# Patient Record
Sex: FEMALE | Race: WHITE
[De-identification: names, ages, dates, MRNs, and addresses within clinical notes are randomized per-mention and may not be internally consistent; named-entity substitution may affect disease eponyms.]

---

## 2020-09-21 LAB
ADD MANUAL MICROSCOPIC: YES
ALBUMIN SERPL-MCNC: 4.4 G/DL (ref 3.5–5)
ALP SERPL-CCNC: 87 U/L (ref 38–126)
ANION GAP SERPL CALC-SCNC: 7 MMOL/L (ref 5–19)
APPEARANCE UR: (no result)
APTT PPP: YELLOW S
AST SERPL-CCNC: 24 U/L (ref 14–36)
BACTERIA #/AREA URNS HPF: (no result) /HPF
BILIRUB DIRECT SERPL-MCNC: 0.2 MG/DL (ref 0–0.4)
BILIRUB SERPL-MCNC: 0.4 MG/DL (ref 0.2–1.3)
BILIRUB UR QL STRIP: NEGATIVE
BUN SERPL-MCNC: 20 MG/DL (ref 7–20)
CALCIUM: 9.3 MG/DL (ref 8.4–10.2)
CHLORIDE SERPL-SCNC: 108 MMOL/L (ref 98–107)
CO2 SERPL-SCNC: 27 MMOL/L (ref 22–30)
ERYTHROCYTE [DISTWIDTH] IN BLOOD BY AUTOMATED COUNT: 13.2 % (ref 11.5–14)
GLUCOSE SERPL-MCNC: 99 MG/DL (ref 75–110)
GLUCOSE UR STRIP-MCNC: NEGATIVE MG/DL
HCT VFR BLD CALC: 42 % (ref 36–47)
HGB BLD-MCNC: 14.3 G/DL (ref 12–15.5)
KETONES UR STRIP-MCNC: NEGATIVE MG/DL
MCH RBC QN AUTO: 30.8 PG (ref 27–33.4)
MCHC RBC AUTO-ENTMCNC: 34 G/DL (ref 32–36)
MCV RBC AUTO: 91 FL (ref 80–97)
NITRITE UR QL STRIP: NEGATIVE
PH UR STRIP: 6 [PH] (ref 5–9)
PLATELET # BLD: 245 10^3/UL (ref 150–450)
POTASSIUM SERPL-SCNC: 4.7 MMOL/L (ref 3.6–5)
PROT SERPL-MCNC: 7 G/DL (ref 6.3–8.2)
PROT UR STRIP-MCNC: NEGATIVE MG/DL
RBC # BLD AUTO: 4.64 10^6/UL (ref 3.72–5.28)
SP GR UR STRIP: 1.02
UROBILINOGEN UR-MCNC: NEGATIVE MG/DL (ref ?–2)
WBC # BLD AUTO: 7.4 10^3/UL (ref 4–10.5)
WBC #/AREA URNS HPF: (no result) /HPF

## 2020-09-21 NOTE — RADIOLOGY REPORT (SQ)
EXAM DESCRIPTION:  CHEST PA/LATERAL



IMAGES COMPLETED DATE/TIME:  9/21/2020 12:38 pm



REASON FOR STUDY:  PRE-OP



COMPARISON:  1/9/2017



EXAM PARAMETERS:  NUMBER OF VIEWS: two views

TECHNIQUE: Digital Frontal and Lateral radiographic views of the chest acquired.

RADIATION DOSE: NA

LIMITATIONS: none



FINDINGS:  LUNGS AND PLEURA: No opacities, masses or pneumothorax. No pleural effusion.

MEDIASTINUM AND HILAR STRUCTURES: No masses or contour abnormalities.

HEART AND VASCULAR STRUCTURES: Heart normal size.  No evidence for failure.

BONES: No acute findings.

HARDWARE: None in the chest.

OTHER: No other significant finding.



IMPRESSION:  NO SIGNIFICANT RADIOGRAPHIC FINDING IN THE CHEST.



TECHNICAL DOCUMENTATION:  JOB ID:  [id number]

 2011 [employer]- All Rights Reserved



Reading location - IP/workstation name: [email or web address]

## 2020-09-24 ENCOUNTER — HOSPITAL ENCOUNTER (OUTPATIENT)
Dept: HOSPITAL 62 - OROUT | Age: 50
LOS: 1 days | Discharge: HOME | End: 2020-09-25
Attending: OBSTETRICS & GYNECOLOGY
Payer: COMMERCIAL

## 2020-09-24 DIAGNOSIS — Z91.040: ICD-10-CM

## 2020-09-24 DIAGNOSIS — N94.6: Primary | ICD-10-CM

## 2020-09-24 DIAGNOSIS — Z79.899: ICD-10-CM

## 2020-09-24 DIAGNOSIS — N87.0: ICD-10-CM

## 2020-09-24 DIAGNOSIS — E79.0: ICD-10-CM

## 2020-09-24 DIAGNOSIS — Z03.818: ICD-10-CM

## 2020-09-24 DIAGNOSIS — J45.909: ICD-10-CM

## 2020-09-24 DIAGNOSIS — N83.8: ICD-10-CM

## 2020-09-24 DIAGNOSIS — N93.9: ICD-10-CM

## 2020-09-24 DIAGNOSIS — Z87.891: ICD-10-CM

## 2020-09-24 DIAGNOSIS — D25.1: ICD-10-CM

## 2020-09-24 DIAGNOSIS — I10: ICD-10-CM

## 2020-09-24 PROCEDURE — 86850 RBC ANTIBODY SCREEN: CPT

## 2020-09-24 PROCEDURE — C1758 CATHETER, URETERAL: HCPCS

## 2020-09-24 PROCEDURE — A4649 SURGICAL SUPPLIES: HCPCS

## 2020-09-24 PROCEDURE — 80053 COMPREHEN METABOLIC PANEL: CPT

## 2020-09-24 PROCEDURE — 71046 X-RAY EXAM CHEST 2 VIEWS: CPT

## 2020-09-24 PROCEDURE — 87635 SARS-COV-2 COVID-19 AMP PRB: CPT

## 2020-09-24 PROCEDURE — 86901 BLOOD TYPING SEROLOGIC RH(D): CPT

## 2020-09-24 PROCEDURE — C9803 HOPD COVID-19 SPEC COLLECT: HCPCS

## 2020-09-24 PROCEDURE — 88307 TISSUE EXAM BY PATHOLOGIST: CPT

## 2020-09-24 PROCEDURE — 36415 COLL VENOUS BLD VENIPUNCTURE: CPT

## 2020-09-24 PROCEDURE — 81001 URINALYSIS AUTO W/SCOPE: CPT

## 2020-09-24 PROCEDURE — 93010 ELECTROCARDIOGRAM REPORT: CPT

## 2020-09-24 PROCEDURE — 58571 TLH W/T/O 250 G OR LESS: CPT

## 2020-09-24 PROCEDURE — 94799 UNLISTED PULMONARY SVC/PX: CPT

## 2020-09-24 PROCEDURE — 93005 ELECTROCARDIOGRAM TRACING: CPT

## 2020-09-24 PROCEDURE — 86900 BLOOD TYPING SEROLOGIC ABO: CPT

## 2020-09-24 PROCEDURE — 85027 COMPLETE CBC AUTOMATED: CPT

## 2020-09-24 PROCEDURE — 81025 URINE PREGNANCY TEST: CPT

## 2020-09-24 RX ADMIN — ATROPINE SULFATE ONE MG: 0.1 INJECTION, SOLUTION INTRAVENOUS at 10:45

## 2020-09-24 RX ADMIN — ATROPINE SULFATE ONE MG: 0.1 INJECTION, SOLUTION INTRAVENOUS at 10:40

## 2020-09-24 RX ADMIN — OXYCODONE AND ACETAMINOPHEN PRN TAB: 5; 325 TABLET ORAL at 22:29

## 2020-09-24 RX ADMIN — OXYCODONE AND ACETAMINOPHEN PRN TAB: 5; 325 TABLET ORAL at 14:40

## 2020-09-24 RX ADMIN — DOCUSATE SODIUM SCH: 100 CAPSULE, LIQUID FILLED ORAL at 12:14

## 2020-09-24 RX ADMIN — DOCUSATE SODIUM SCH MG: 100 CAPSULE, LIQUID FILLED ORAL at 17:09

## 2020-09-24 RX ADMIN — KETOROLAC TROMETHAMINE SCH MG: 30 INJECTION, SOLUTION INTRAMUSCULAR at 13:36

## 2020-09-24 RX ADMIN — ATROPINE SULFATE ONE MG: 0.1 INJECTION, SOLUTION INTRAVENOUS at 10:35

## 2020-09-24 RX ADMIN — KETOROLAC TROMETHAMINE SCH MG: 30 INJECTION, SOLUTION INTRAMUSCULAR at 21:16

## 2020-09-24 NOTE — OPERATIVE REPORT
Operative Report


DATE OF SURGERY: 09/24/20


PREOPERATIVE DIAGNOSIS: Abnormal uterine bleeding, pelvic pain,


POSTOPERATIVE DIAGNOSIS: Same


OPERATION: Biotic assisted total laparoscopic hysterectomy with bilateral 

salpingectomy and lysis of adhesions


SURGEON: JACK AMARAL


1ST ASSISTANT: MIAN GRAHAM


ANESTHESIA: GA


TISSUE REMOVED OR ALTERED: uterus Cervix bilateral fallopian tubes


COMPLICATIONS: 





None


ESTIMATED BLOOD LOSS: 150 cc


INTRAOPERATIVE FINDINGS: Uterus approximately 10 weeks size, adhesions of the 

descending bowel to the left fallopian tube at the epiploica, normal ovaries, 

fallopian tubes, the cervix was somewhat flush to the vaginal wall, difficulty 

with transecting the internal cervical os for V care manipulator placement.  The

entire cervix was removed at the end of the case however


PROCEDURE: 





Patient was taken to the operating room prepared and draped in normal sterile 

fashion in dorsolithotomy position. Under sterile conditions a Gregory catheter 

was placed to gravity. Speculum was placed into the vagina and the cervix was 

grasped on the anterior lip with a single-tooth tenaculum. The cervix was then 

dilated to accommodate a medium V care uterine manipulator. Manipulate it was 

placed with difficulty and the v care cup was assessed to be flush around the 

cervix.  gloves were changed and attention was turned to the upper portion of 

the case. A 2-1/2 cm umbilical skin incision was made 11 blade and this was 

carried through to the underlying layer of fascia with the same 11 blade. It was

grasped to Marisol's acted with Cam's. New cavity was entered bluntly. A GelPort 

was placed in a normal fashion the camera port and air seal in the appropriate 

locations. Gomes was then inflated with approximately 2 L of CO2 gas. The 

camera was then introduced into the peritoneal cavity through the camera port 

and the patient was placed in steep Trendelenburg. The above findings were 

noted. Under direct visualization two 5 mm ports were placed approximately 10 cm

on either side of the umbilicus. The robot was then docked with the vessel se

aler placed on the patient's left and the monopolar scissors placed placed on 

the patient's right. I then unscrubbed and set at the robotic console beginning 

with the left adnexa, the epiploca was transacted from the fallopian tube until 

the adhesions were released and then the fallopian tube was transected from the 

uterus using the vessel sealer and monopolar scissors as needed. The fallopian 

tube was then removed through the assistance port. The ovarian ligament was then

transected using the vessel sealer. The uterine artery was skeletonized using 

blunt dissection and ligated using the vessel sealer down to the level of the 

external cervical os. The bladder flap was then begun using monopolar scissors 

and blunt dissection over the V care cup noted through the mucosa. Attention was

then turned to the right adnexa where the fallopian tube was transected in a 

similar fashion. The utero-ovarian ligament was transected using the vessel 

sealer. The Uterine artery was then transected using the vessel sealer and 

skeletonized using blunt dissection. The vessel sealer was again used to 

completely transect the uterine artery down to the level of the external 

cervical os. The bladder flap was completed using similar sharp and blunt 

dissection. Once the bladder was felt to be adequately away from the lower 

uterine segment, the colpotomy was begun on the anterior aspect of the cervix 

following the outline of the V care cup mucosa. The cup was followed in a 

circumferential fashion completely around the cervix estimate was completely 

freed. The specimen was then removed through the vaginal defect. The instruments

were then changed to a Mark needle  and pro-grasp. AV lock needle was 

introduced through the assistance port. The lock needle was used to close the 

vaginal cuff and hemostasis. The needle was then removed through the assistance 

port. The peritoneal cavity was carefully inspected the ureters were noted to 

both be peristalsing and there was no signs of hydroureter. The robot was then 

undocked. The fascia was closed at the umbilical skin incision seen 0 Vicryl 3 

skin incisions were closed using 4-0 Vicryl. Sponge lap and needle counts were 

correct x2 and the patient was taken to recovery in stable condition.

## 2020-09-25 VITALS — DIASTOLIC BLOOD PRESSURE: 74 MMHG | SYSTOLIC BLOOD PRESSURE: 118 MMHG

## 2020-09-25 LAB
ERYTHROCYTE [DISTWIDTH] IN BLOOD BY AUTOMATED COUNT: 13.5 % (ref 11.5–14)
HCT VFR BLD CALC: 38.1 % (ref 36–47)
HGB BLD-MCNC: 12.7 G/DL (ref 12–15.5)
MCH RBC QN AUTO: 30.6 PG (ref 27–33.4)
MCHC RBC AUTO-ENTMCNC: 33.5 G/DL (ref 32–36)
MCV RBC AUTO: 92 FL (ref 80–97)
PLATELET # BLD: 206 10^3/UL (ref 150–450)
RBC # BLD AUTO: 4.16 10^6/UL (ref 3.72–5.28)
WBC # BLD AUTO: 11.8 10^3/UL (ref 4–10.5)

## 2020-09-25 RX ADMIN — OXYCODONE AND ACETAMINOPHEN PRN TAB: 5; 325 TABLET ORAL at 03:04

## 2020-09-25 RX ADMIN — OXYCODONE AND ACETAMINOPHEN PRN TAB: 5; 325 TABLET ORAL at 08:16

## 2020-09-25 RX ADMIN — KETOROLAC TROMETHAMINE SCH: 30 INJECTION, SOLUTION INTRAMUSCULAR at 06:03

## 2020-09-25 NOTE — PDOC DISCHARGE SUMMARY
Impression





- Admit/DC Date/PCP


Admission Date/Primary Care Provider: 


  





  DEUCE PRIEST MD





Discharge Date: 09/25/20





- Discharge Diagnosis


(1) Abnormal uterine and vaginal bleeding, unspecified


Is this a current diagnosis for this admission?: Yes   





(2) Pelvic pain


Is this a current diagnosis for this admission?: Yes   





- Assessment


Summary: 


underwent an RATLH w/ bilater salpingectomy without complications.  unremarkable

postoperative course.  voiding well and tolerating regular diet.  requesting to 

go home





- Additional Information


Resuscitation Status: Full Code


Discharge Diet: As Tolerated


Discharge Activity: Balance Activity w/Rest, No Driving, No Lifting Over 10 

Pounds, No Lifting/Push/Pulling, Pelvic Rest, No tub bath, Walk Frequently


Referrals: 


JACK AMARAL MD [ACTIVE STAFF] - 10/09/20 10:00 am (CALL THE OFFICE FOR ANY

QUESTIONS OR CONCERNS)


DEUCE PRIEST MD [Primary Care Provider] - 


Prescriptions: 


Oxycodone HCl/Acetaminophen [Percocet 5-325 mg Tablet] 1 tab PO Q4HP PRN #30 

tablet


 PRN Reason: 


Docusate Sodium [Colace 100 mg Capsule] 100 mg PO BID #60 capsule


Ibuprofen [Motrin 800 mg Tablet] 800 mg PO Q6 #60 tablet


Home Medications: 








Fexofenadine/Pseudoephedrine [Allegra-D 24 Hour Tablet] 1 each PO ASDIR PRN 

09/21/20 


Propranolol HCl [Inderal Xl] 120 mg PO ASDIR PRN 09/21/20 


Docusate Sodium [Colace 100 mg Capsule] 100 mg PO BID #60 capsule 09/25/20 


Ibuprofen [Motrin 800 mg Tablet] 800 mg PO Q6 #60 tablet 09/25/20 


Oxycodone HCl/Acetaminophen [Percocet 5-325 mg Tablet] 1 tab PO Q4HP PRN #30 

tablet 09/25/20 











History of Present Illiness


History of Present Illness: 


PAMELA HECK is a 50 year old female








Physical Exam





- Physical Exam


Vital Signs: 


                                        











Temp Pulse Resp BP Pulse Ox


 


 98.1 F   51 L  16   145/70 H  96 


 


 09/25/20 03:50  09/25/20 03:50  09/25/20 03:50  09/25/20 03:50  09/25/20 03:50








                                 Intake & Output











 09/24/20 09/25/20 09/26/20





 06:59 06:59 06:59


 


Intake Total 0 3175 


 


Output Total  2200 


 


Balance 0 975 


 


Weight 83.01 kg 85.91 kg 














Results


Laboratory Results: 


                                        











WBC  7.4 10^3/uL (4.0-10.5)   09/21/20  11:57    


 


RBC  4.64 10^6/uL (3.72-5.28)   09/21/20  11:57    


 


Hgb  14.3 g/dL (12.0-15.5)   09/21/20  11:57    


 


Hct  42.0 % (36.0-47.0)   09/21/20  11:57    


 


MCV  91 fl (80-97)   09/21/20  11:57    


 


MCH  30.8 pg (27.0-33.4)   09/21/20  11:57    


 


MCHC  34.0 g/dL (32.0-36.0)   09/21/20  11:57    


 


RDW  13.2 % (11.5-14.0)   09/21/20  11:57    


 


Plt Count  245 10^3/uL (150-450)   09/21/20  11:57    


 


Sodium  141.5 mmol/L (137-145)   09/21/20  11:57    


 


Potassium  4.7 mmol/L (3.6-5.0)   09/21/20  11:57    


 


Chloride  108 mmol/L ()  H  09/21/20  11:57    


 


Carbon Dioxide  27 mmol/L (22-30)   09/21/20  11:57    


 


Anion Gap  7  (5-19)   09/21/20  11:57    


 


BUN  20 mg/dL (7-20)   09/21/20  11:57    


 


Creatinine  0.72 mg/dL (0.52-1.25)   09/21/20  11:57    


 


Est GFR ( Amer)  > 60  (>60)   09/21/20  11:57    


 


Est GFR (MDRD) Non-Af  > 60  (>60)   09/21/20  11:57    


 


Glucose  99 mg/dL ()   09/21/20  11:57    


 


Calcium  9.3 mg/dL (8.4-10.2)   09/21/20  11:57    


 


Total Bilirubin  0.4 mg/dL (0.2-1.3)   09/21/20  11:57    


 


Direct Bilirubin  0.2 mg/dL (0.0-0.4)   09/21/20  11:57    


 


Neonat Total Bilirubin  Not Reportable   09/21/20  11:57    


 


Neonat Direct Bilirubin  Not Reportable   09/21/20  11:57    


 


Neonat Indirect Bili  Not Reportable   09/21/20  11:57    


 


AST  24 U/L (14-36)   09/21/20  11:57    


 


ALT  22 U/L (<35)   09/21/20  11:57    


 


Alkaline Phosphatase  87 U/L ()   09/21/20  11:57    


 


Total Protein  7.0 g/dL (6.3-8.2)   09/21/20  11:57    


 


Albumin  4.4 g/dL (3.5-5.0)   09/21/20  11:57    


 


Urine Color  YELLOW   09/21/20  11:04    


 


Urine Appearance  SLIGHTLY-CLOUDY   09/21/20  11:04    


 


Urine pH  6.0  (5.0-9.0)   09/21/20  11:04    


 


Ur Specific Gravity  1.019   09/21/20  11:04    


 


Urine Protein  NEGATIVE mg/dL (NEGATIVE)   09/21/20  11:04    


 


Urine Glucose (UA)  NEGATIVE mg/dL (NEGATIVE)   09/21/20  11:04    


 


Urine Ketones  NEGATIVE mg/dL (NEGATIVE)   09/21/20  11:04    


 


Urine Blood  NEGATIVE  (NEGATIVE)   09/21/20  11:04    


 


Urine Nitrite  NEGATIVE  (NEGATIVE)   09/21/20  11:04    


 


Urine Bilirubin  NEGATIVE  (NEGATIVE)   09/21/20  11:04    


 


Urine Urobilinogen  NEGATIVE mg/dL (<2.0)   09/21/20  11:04    


 


Ur Leukocyte Esterase  NEGATIVE  (NEGATIVE)   09/21/20  11:04    


 


Urine WBC  1-5 /HPF  09/21/20  11:04    


 


Ur Squamous Epith Cells  FEW /HPF  09/21/20  11:04    


 


Urine Bacteria  1+ /HPF  09/21/20  11:04    


 


Urine Mucus  1+   09/21/20  11:04    


 


Urine Ascorbic Acid  NEGATIVE  (NEGATIVE)   09/21/20  11:04    


 


Urine HCG, Qual  NEGATIVE  (NEGATIVE)   09/24/20  05:30    


 


COVID-19 Source  See comment   09/21/20  11:47    


 


COVID-19 ([facility])  Not Detected  (Not Detect)   09/21/20  11:47    


 


Blood Type  O POSITIVE   09/21/20  11:57    


 


Antibody Screen  NEGATIVE   09/21/20  11:57    











Impressions: 


                                        





Chest X-Ray  09/21/20 12:28


IMPRESSION:  NO SIGNIFICANT RADIOGRAPHIC FINDING IN THE CHEST.


 














Stroke


Is this a Stroke Patient?: No





Acute Heart Failure


Is this a Heart Failure Patient?: No